# Patient Record
Sex: MALE | URBAN - METROPOLITAN AREA
[De-identification: names, ages, dates, MRNs, and addresses within clinical notes are randomized per-mention and may not be internally consistent; named-entity substitution may affect disease eponyms.]

---

## 2017-05-11 ENCOUNTER — OFFICE VISIT (OUTPATIENT)
Dept: FAMILY MEDICINE | Facility: CLINIC | Age: 67
End: 2017-05-11

## 2017-05-11 DIAGNOSIS — Z02.89 HEALTH EXAMINATION OF DEFINED SUBPOPULATION: Primary | ICD-10-CM

## 2017-05-11 LAB
BILIRUB UR QL: NORMAL
CLARITY: CLEAR
COLOR UR: YELLOW
GLUCOSE URINE: NORMAL MG/DL
HGB UR QL: NORMAL
KETONES UR QL: NORMAL MG/DL
NITRITE UR QL STRIP: NORMAL
PH UR STRIP: 6 PH (ref 5–7)
PROT UR QL: NORMAL MG/DL
SP GR UR STRIP: NORMAL (ref 1–1.03)
SPECIMEN VOL UR: NORMAL ML
UROBILINOGEN UR QL STRIP: 0.2 EU/DL (ref 0.2–1)
WBC #/AREA URNS HPF: NORMAL /[HPF]

## 2017-05-11 PROCEDURE — 81003 URINALYSIS AUTO W/O SCOPE: CPT | Performed by: FAMILY MEDICINE

## 2017-05-11 PROCEDURE — 99499 UNLISTED E&M SERVICE: CPT | Performed by: FAMILY MEDICINE

## 2017-05-11 NOTE — LETTER
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 Please check the following:    Hypertension needs treatment. Hematuria needs evaluation and treatment.    Blood pressure for hypertension control, should be less than 140/90 to qualify.  Documentation of normal urine or work up for microscopic hematuria.    Send a copy of these results back with the patient so that I can qualify the DOT medical exam.    Mary Saleem MD

## 2017-05-11 NOTE — PROGRESS NOTES
"Form MCSA-5875 (revised 2015)                                       OMB No. [id number]  Expiration Date: 2018    MEDICAL EXAMINATION REPORT FORM  (FOR  MEDICAL CERTIFICATION)    SECTION 1.  Information (to be filled out by )    PERSONAL INFORMATION  Last Name: Ramana  First Name: Prince De Oliveira Initial: E  : 1950      Age: 66        Street Address: [address] [place] [address] [address]   City: [place]   State/Province: [place]   Zip Code: [address]  's License Number: [id number]      Issuing State/Province: [place]       Phone: [phone]     Gender: male  E-mail (optional): NA  CLP/CDL Applicant Hanks*: no   ID Verified by**:  Ian Campbell CMA  Has your [employer] medical certificate ever been denied or issued for less than 2 years? NO   (*CLP/CDL Applicant/Hanks: See instructions for definitions)  (** ID verified By:Record what type of photo ID was used to verify the identity of the , e.g., CDL,'s license, passport)     HEALTH HISTORY  Have you ever had surgery? If \"yes\", please list and explain below. NO    Are you currently taking medications (prescription, over-the-counter, herbal remedies, diet supplements)? If \"yes,\" please describe below. YES    Atenolol 25mg daily       Do you have or have you ever had:     1. Head/ brain injuries or illnesses (e.g., concussion)    NO     2. Seizures, epilepsy?   NO     3. Eye problems (except glasses or contacts)?   NO     4. Ear and/or hearing problems   NO     5. Heart disease, heart attack; bypass, or other heart problems   NO     6. Pacemaker, stents, implantable devices, or other heart procedures   NO     7. High blood pressure   YES- does not have a regular primary care provider      8. High cholesterol   NO     9. Chronic (long-term) cough, shortness of breath, or other breathing problems   NO   10. Lung disease (e.g. asthma)   NO   11. Kidney problems, kidney stones, or pain/problems " "with urination   NO   12. Stomach, liver, or digestive problems   NO   13. Diabetes or blood sugar problems        Insulin Used?   NO    NO   14. Anxiety, depression, nervousness, other mental health problems   NO   15. Fainting or passing out   NO   16. Dizziness, headaches, numbness, tingling, or memory loss?   NO   17. Unexplained weight loss   NO   18. Stroke, mini-stroke (TIA), paralysis, or weakness   NO   19. Missing or limited use of arm, hand, finger, leg, foot, toe   NO   20. Neck or back problems   NO   21. Bone, muscle, joint or nerve problems   NO   22. Blood clots or bleeding problems   NO   23. Cancer   NO   24. Chronic (long-term) infection or other chronic diseases   NO   25. Sleep disorders, pauses in breathing while asleep, daytime sleepiness, loud snoring?   NO   26. Have you ever had a sleep test (e.g. sleep apnea)?   NO   27. Have you ever spent a night in the hospital?   NO   28. Have you ever had a broken bone?   NO   29. Have you ever used or do you now use tobacco?   NO   30. Do you currently drink alcohol?   NO   31. Have you used an illegal substance within the past two years?   NO   32. Have you ever failed a drug test or been dependent on an illegal substance?   NO     Other health condition(s) not described above: NO    Did you answer \"yes\" to any of questions 1-32?  If so, please comment further on those health conditions below: YES    Currently out of blood pressure medication x2-3 weeks              'S SIGNATURE  I certify that the above information is accurate and complete. I understand that inaccurate, false or missing information may invalidate the examination and my Medical Examiner's Certificate, that submission of fraudulent or intentionally false information is a violation of 49CFR 390.35, and that submission of fraudulent or intentionally false information may subject me to civil or ciminal penalties under 49 .37 and 49  Appendices A and B. " "    's signature:____________________________        Date: 5/11/2017                                         Signature if printed       Section 2. Examination Report (to be filled out by the medical examiner)      HEALTH HISTORY REVIEW  Review and discuss pertinent  answers and any available medical records. 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        Patient has no chronic illnesses other than hypertension, or current medical problems that interfere with driving safety. He is currently taking medications for hypertension and is stable on medications. He should be able to drive safely at this time.  We discussed the use of OTC medications and effects on driving safety.              TESTING     Pulse rate: 71     Pulse rhythm regular: YES  Height: 5 feet 9.5 inches  Weight: 211 pounds    Blood Pressure  Blood Pressure: 181 Systolic  94 Diastolic  Sitting YES  Second Reading (optional) 171/88  Other Testing if indicated    Needs to see primary care provider for blood pressure management       Urinalysis  Urinalysis is required. Numerical readings must be recorded.  Urine Specimen Specific Gravity Protein Blood Sugar    1.005 NEGATIVE 2+ NEGATIVE   Protein, blood or sugar in the urine may be an indication for further testing to rule out any underlying medical problem.    Vision  Standard is at least 20/40 acuity (Snellen) in each eye with or without correction. At least 70  field of vision in horizontal meridian measured in each eye. The use of corrective lenses should be noted on the Medical Examiner's Certificate.    ACUITY UNCORRECTED CORRECTED HORIZONTAL FIELD OF VISION   Right Eye N/A 20/20 Greater than 70 degrees   Left Eye N/A 20/20 Greater than 70 degrees   Both Eyes N/A 20/20      Applicant can recognize and distinguish among traffic control signals and devices showing red, " green and parul colors? Yes    Monocular vision: No    Referred to ophthalmologist or optometrist?  No    Received documentation from ophthalmologist or optometrist?  No    HEARING   Standard: Must first perceive forced whispered voice at not less than 5 feet OR average hearing loss of less than or equal to 40 dB, in better ear (with or without hearing aid).    Check if hearing aid used for test:  Neither    Whispered voice test:    Record the distance from the individual at which a forced whispered voice can first be heard:        Right Ear: greater than 5 feet                  Left Ear: greater than 5 feet             PHYSICAL EXAMINATION  The presence of a certain condition may not necessarily disqualify a , particularly if the condition is controlled adequately, is not likely to worsen, or is readily amenable to treatment. Even if a condition does not disqualify a , the Medical Examiner may consider deferring the  temporarily. Also, the  should be advised to take the necessary steps to correct the condition as soon as possible, particularly if neglecting the condition, could result in more serious illness that might affect driving.    Check the body systems for abnormalities.  BODY SYSTEM Normal or Abnormal   1. General  Normal   2. Skin Normal   3. Eyes Normal   4. Ears Normal   5. Mouth/throat Normal   6. Cardiovascular Normal   7. Lungs/chest Normal   8. Abdomen Normal   9. Genito-urinary System including hernias Normal   10. Back/Spine Normal   11. Extremities/joints Normal   12. Neurological system including reflexes Normal   13. Gait Normal   14. Vascular system Normal     Discuss any abnormal answers in detail in the space below and indicate whether it would affect the 's ability to operate a CMV. Enter applicable item number before each comment.     Patient has a normal physical exam with good strength and dexterity. Normal prehension and coordination. No findings that would  interfere with safe driving or are cause for concern.             Please complete only one of the following (Federal or State) Medical Examiner Determination sections:    MEDICAL EXAMINER DETERMINATION (State)  Use this section for examinations performed in accordance with the Federal Motor Carrier Safety Regulations (49 ..49) with any applicable State variances (which will only be valid for intrastate operations):    Does not qualify  Hypertension- poorly controlled with no follow up appointment. Persistent hematuria with no documented evaluation or treatment      qualified for: Not qualified            If the  meets the standards outlined in 49 .41, with applicable State variances, then complete a Medical Examiner's Certificate, as appropriate.     I have performed this evaluation for certification. I have personally reviewed all available records and recorded information pertaining to this evaluation, and attest that to the best of my knowledge, I believe it to be true and correct.    Medical Examiner's Signature: _________________________________________                                                                                   (if printed)    Medical Examiner's Name: Mary Saleem MD  Medical Examiner's Address:   [address] [address]  [phone]  Dept: [phone]    Date Certificate Signed: 5/23/2017     Medical Examiner's State License, Certificate, or Registration Number: [id number]    Issuing State:  [place]    National Registry Number:  [id number]                 Medical Examiner's Certificate Expiration Date: NA                          Date submitted to registry: 05/25/2017  Submitted by: Ian Campbell CMA

## 2017-05-11 NOTE — MR AVS SNAPSHOT
"              After Visit Summary   2017    Jessica EMPLOYERRELATED    MRN: [id number]           Patient Information     Date Of Birth          1950        Visit Information        Provider Department      2017 3:20 PM Mary Saleem MD [facility]        Today's Diagnoses     Health examination of defined subpopulation    -  1       Follow-ups after your visit        Who to contact     If you have questions or need follow up information about today's clinic visit or your schedule please contact [facility] directly at [phone].  Normal or non-critical lab and imaging results will be communicated to you by DGSEhart, letter or phone within 4 business days after the clinic has received the results. If you do not hear from us within 7 days, please contact the clinic through DGSEhart or phone. If you have a critical or abnormal lab result, we will notify you by phone as soon as possible.  Submit refill requests through [employer] or call your pharmacy and they will forward the refill request to us. Please allow 3 business days for your refill to be completed.          Additional Information About Your Visit        MyChart Information     [employer] lets you send messages to your doctor, view your test results, renew your prescriptions, schedule appointments and more. To sign up, go to [email or web address] . Click on \"Log in\" on the left side of the screen, which will take you to the Welcome page. Then click on \"Sign up Now\" on the right side of the page.     You will be asked to enter the access code listed below, as well as some personal information. Please follow the directions to create your username and password.     Your access code is: [email or web address]  Expires: 2017  7:44 PM     Your access code will  in 90 days. If you need help or a new code, please call your [facility] or [phone].        Care EveryWhere ID     This is your Care " EveryWhere ID. This could be used by other organizations to access your [place] medical records  [id number]         Blood Pressure from Last 3 Encounters:   No data found for BP    Weight from Last 3 Encounters:   No data found for Wt              We Performed the Following     OH U/A W/O MICRO        Primary Care Provider    None Specified       No primary provider on file.        Thank you!     Thank you for choosing [facility]  for your care. Our goal is always to provide you with excellent care. Hearing back from our patients is one way we can continue to improve our services. Please take a few minutes to complete the written survey that you may receive in the mail after your visit with us. Thank you!             Your Updated Medication List - Protect others around you: Learn how to safely use, store and throw away your medicines at www.disposemymeds.org.      Notice  As of 5/11/2017  7:44 PM    You have not been prescribed any medications.

## 2017-06-20 ENCOUNTER — DOCUMENTATION ONLY (OUTPATIENT)
Dept: VASCULAR SURGERY | Facility: CLINIC | Age: 67
End: 2017-06-20

## 2017-06-20 DIAGNOSIS — Z72.0 CURRENT TOBACCO USE: Primary | ICD-10-CM

## 2017-06-20 DIAGNOSIS — Z13.6 ENCOUNTER FOR ABDOMINAL AORTIC ANEURYSM (AAA) SCREENING: Primary | ICD-10-CM
